# Patient Record
Sex: FEMALE | Race: WHITE | NOT HISPANIC OR LATINO | Employment: FULL TIME | URBAN - METROPOLITAN AREA
[De-identification: names, ages, dates, MRNs, and addresses within clinical notes are randomized per-mention and may not be internally consistent; named-entity substitution may affect disease eponyms.]

---

## 2020-10-28 ENCOUNTER — TELEPHONE (OUTPATIENT)
Dept: OPHTHALMOLOGY | Facility: CLINIC | Age: 30
End: 2020-10-28

## 2020-10-28 NOTE — TELEPHONE ENCOUNTER
Advised patient that since her last visit was in 2013 we don't have access to the [facility] Rx to even fax it for her. Patient states that it's ok because she has a copy of it from [facility] and they will fax it to [facility] for her.

## 2020-10-28 NOTE — TELEPHONE ENCOUNTER
----- Message from Mathew Braydon sent at 10/28/2020  2:55 PM CDT -----  Contact: self  Would like to consult with nurse regarding contact Rx.  Pt states she would like a copy of this document faxed to [phone] for [facility]obe[facility] P[facility], even if the Rx even if it is .  Please contact Sabrina Villalobos @ [phone].  Thanks/As

## 2023-03-16 ENCOUNTER — TELEPHONE (OUTPATIENT)
Dept: HEPATOLOGY | Facility: CLINIC | Age: 33
End: 2023-03-16
Payer: MEDICAID

## 2023-03-16 NOTE — TELEPHONE ENCOUNTER
----- Message from Shannan Barron RN sent at 3/15/2023  1:38 PM CDT -----    ----- Message -----  From: Nella Luque  Sent: 3/15/2023  12:31 PM CDT  To: [facility] Clinical Support Staff    Type:  Sooner Apoointment Request    Caller is requesting a sooner appointment.  Caller declined first available appointment listed below.  Caller will not accept being placed on the waitlist and is requesting a message be sent to doctor.  Name of Caller:patient  When is the first available appointment?na  Symptoms:Np Hep C issues referral from Dr Mary Wahl  Would the patient rather a call back or a response via [facility]? Call back  Best Call Back Number:[phone]  Additional Information: na